# Patient Record
Sex: FEMALE | Race: WHITE | Employment: UNEMPLOYED | URBAN - METROPOLITAN AREA
[De-identification: names, ages, dates, MRNs, and addresses within clinical notes are randomized per-mention and may not be internally consistent; named-entity substitution may affect disease eponyms.]

---

## 2017-04-11 PROBLEM — Z86.32 HISTORY OF GESTATIONAL DIABETES: Status: ACTIVE | Noted: 2017-04-11

## 2017-05-07 PROBLEM — R87.613 PAP SMEAR ABNORMALITY OF CERVIX WITH HGSIL: Status: ACTIVE | Noted: 2017-05-07

## 2017-07-12 PROBLEM — O24.419 WHITE CLASSIFICATION A2 GESTATIONAL DIABETES MELLITUS: Status: ACTIVE | Noted: 2017-04-11

## 2017-07-28 PROBLEM — D64.9 ANEMIA: Status: ACTIVE | Noted: 2017-07-28

## 2017-08-10 PROBLEM — O24.410 WHITE CLASSIFICATION A1 GESTATIONAL DIABETES MELLITUS: Status: ACTIVE | Noted: 2017-04-11

## 2017-09-07 PROBLEM — O40.9XX0 POLYHYDRAMNIOS: Status: ACTIVE | Noted: 2017-09-07

## 2017-10-18 ENCOUNTER — HOSPITAL ENCOUNTER (INPATIENT)
Age: 37
LOS: 3 days | Discharge: HOME | End: 2017-10-21
Attending: OBSTETRICS & GYNECOLOGY | Admitting: OBSTETRICS & GYNECOLOGY
Payer: OTHER GOVERNMENT

## 2017-10-18 ENCOUNTER — ANESTHESIA EVENT (OUTPATIENT)
Dept: LABOR AND DELIVERY | Age: 37
End: 2017-10-18
Payer: OTHER GOVERNMENT

## 2017-10-18 ENCOUNTER — ANESTHESIA (OUTPATIENT)
Dept: LABOR AND DELIVERY | Age: 37
End: 2017-10-18
Payer: OTHER GOVERNMENT

## 2017-10-18 LAB
ERYTHROCYTE [DISTWIDTH] IN BLOOD BY AUTOMATED COUNT: 16.2 % (ref 11.9–14.6)
GLUCOSE BLD STRIP.AUTO-MCNC: 161 MG/DL (ref 65–100)
HCT VFR BLD AUTO: 38.4 % (ref 35.8–46.3)
HGB BLD-MCNC: 13 G/DL (ref 11.7–15.4)
MCH RBC QN AUTO: 30.4 PG (ref 26.1–32.9)
MCHC RBC AUTO-ENTMCNC: 33.9 G/DL (ref 31.4–35)
MCV RBC AUTO: 89.9 FL (ref 79.6–97.8)
PLATELET # BLD AUTO: 245 K/UL (ref 150–450)
PMV BLD AUTO: 9.5 FL (ref 10.8–14.1)
RBC # BLD AUTO: 4.27 M/UL (ref 4.05–5.25)
WBC # BLD AUTO: 11.7 K/UL (ref 4.3–11.1)

## 2017-10-18 PROCEDURE — 74011000250 HC RX REV CODE- 250

## 2017-10-18 PROCEDURE — 86900 BLOOD TYPING SEROLOGIC ABO: CPT | Performed by: OBSTETRICS & GYNECOLOGY

## 2017-10-18 PROCEDURE — 85027 COMPLETE CBC AUTOMATED: CPT | Performed by: OBSTETRICS & GYNECOLOGY

## 2017-10-18 PROCEDURE — A4300 CATH IMPL VASC ACCESS PORTAL: HCPCS | Performed by: ANESTHESIOLOGY

## 2017-10-18 PROCEDURE — 65270000029 HC RM PRIVATE

## 2017-10-18 PROCEDURE — 74011250636 HC RX REV CODE- 250/636

## 2017-10-18 PROCEDURE — 4A1HXCZ MONITORING OF PRODUCTS OF CONCEPTION, CARDIAC RATE, EXTERNAL APPROACH: ICD-10-PCS | Performed by: OBSTETRICS & GYNECOLOGY

## 2017-10-18 PROCEDURE — 82962 GLUCOSE BLOOD TEST: CPT

## 2017-10-18 PROCEDURE — 77030014125 HC TY EPDRL BBMI -B: Performed by: ANESTHESIOLOGY

## 2017-10-18 RX ORDER — DEXTROSE 40 %
15 GEL (GRAM) ORAL AS NEEDED
Status: DISCONTINUED | OUTPATIENT
Start: 2017-10-18 | End: 2017-10-19

## 2017-10-18 RX ORDER — ROPIVACAINE HYDROCHLORIDE 2 MG/ML
INJECTION, SOLUTION EPIDURAL; INFILTRATION; PERINEURAL AS NEEDED
Status: DISCONTINUED | OUTPATIENT
Start: 2017-10-18 | End: 2017-10-19 | Stop reason: HOSPADM

## 2017-10-18 RX ORDER — DEXTROSE 50 % IN WATER (D50W) INTRAVENOUS SYRINGE
25-50 AS NEEDED
Status: DISCONTINUED | OUTPATIENT
Start: 2017-10-18 | End: 2017-10-19

## 2017-10-18 RX ORDER — LIDOCAINE HYDROCHLORIDE 20 MG/ML
JELLY TOPICAL
Status: DISCONTINUED | OUTPATIENT
Start: 2017-10-18 | End: 2017-10-19 | Stop reason: HOSPADM

## 2017-10-18 RX ORDER — OXYTOCIN/RINGER'S LACTATE 30/500 ML
.5-2 PLASTIC BAG, INJECTION (ML) INTRAVENOUS
Status: CANCELLED | OUTPATIENT
Start: 2017-10-18

## 2017-10-18 RX ORDER — LIDOCAINE HYDROCHLORIDE 20 MG/ML
JELLY TOPICAL
Status: CANCELLED | OUTPATIENT
Start: 2017-10-18

## 2017-10-18 RX ORDER — ONDANSETRON 2 MG/ML
4 INJECTION INTRAMUSCULAR; INTRAVENOUS
Status: DISCONTINUED | OUTPATIENT
Start: 2017-10-18 | End: 2017-10-19 | Stop reason: HOSPADM

## 2017-10-18 RX ORDER — LIDOCAINE HYDROCHLORIDE 10 MG/ML
1 INJECTION INFILTRATION; PERINEURAL
Status: CANCELLED | OUTPATIENT
Start: 2017-10-18

## 2017-10-18 RX ORDER — BUTORPHANOL TARTRATE 1 MG/ML
1 INJECTION INTRAMUSCULAR; INTRAVENOUS
Status: DISCONTINUED | OUTPATIENT
Start: 2017-10-18 | End: 2017-10-19 | Stop reason: HOSPADM

## 2017-10-18 RX ORDER — MINERAL OIL
120 OIL (ML) ORAL
Status: COMPLETED | OUTPATIENT
Start: 2017-10-18 | End: 2017-10-19

## 2017-10-18 RX ORDER — BUTORPHANOL TARTRATE 1 MG/ML
1 INJECTION INTRAMUSCULAR; INTRAVENOUS
Status: CANCELLED | OUTPATIENT
Start: 2017-10-18

## 2017-10-18 RX ORDER — ROPIVACAINE HYDROCHLORIDE 2 MG/ML
INJECTION, SOLUTION EPIDURAL; INFILTRATION; PERINEURAL
Status: DISCONTINUED | OUTPATIENT
Start: 2017-10-18 | End: 2017-10-19 | Stop reason: HOSPADM

## 2017-10-18 RX ORDER — LIDOCAINE HYDROCHLORIDE 10 MG/ML
1 INJECTION INFILTRATION; PERINEURAL
Status: DISCONTINUED | OUTPATIENT
Start: 2017-10-18 | End: 2017-10-19 | Stop reason: HOSPADM

## 2017-10-18 RX ORDER — MINERAL OIL
120 OIL (ML) ORAL
Status: CANCELLED | OUTPATIENT
Start: 2017-10-18

## 2017-10-18 RX ORDER — LIDOCAINE HYDROCHLORIDE AND EPINEPHRINE 15; 5 MG/ML; UG/ML
INJECTION, SOLUTION EPIDURAL AS NEEDED
Status: DISCONTINUED | OUTPATIENT
Start: 2017-10-18 | End: 2017-10-19 | Stop reason: HOSPADM

## 2017-10-18 RX ADMIN — ROPIVACAINE HYDROCHLORIDE 10 ML/HR: 2 INJECTION, SOLUTION EPIDURAL; INFILTRATION; PERINEURAL at 23:13

## 2017-10-18 RX ADMIN — LIDOCAINE HYDROCHLORIDE AND EPINEPHRINE 3 ML: 15; 5 INJECTION, SOLUTION EPIDURAL at 23:12

## 2017-10-18 RX ADMIN — ROPIVACAINE HYDROCHLORIDE 8 ML: 2 INJECTION, SOLUTION EPIDURAL; INFILTRATION; PERINEURAL at 23:13

## 2017-10-18 NOTE — IP AVS SNAPSHOT
[address] [facility] [address] 
[phone] Patient: Sandy Bowels MRN: [id number] OVU:[id number] You are allergic to the following Allergen Reactions Pcn (Penicillins) Rash Immunizations Administered for This Admission Name Date MMR  Deferred () Tdap  Deferred () Recent Documentation Weight Breastfeeding? OB Status Smoking Status 85.7 kg Unknown Recent pregnancy Never Smoker Emergency Contacts Name Discharge Info Relation Home Work Mobile Percy Angulo  Spouse [3] [phone] About your hospitalization You were admitted on:  October 18, 2017 You last received care in the:  [address] [facility] You were discharged on:  October 21, 2017 Unit phone number:  [phone] Why you were hospitalized Your primary diagnosis was:  Normal Labor Your diagnoses also included:  40 Weeks Gestation Of Pregnancy, Pap Smear Abnormality Of Cervix With Hgsil, Polyhydramnios, White Classification A2 Gestational Diabetes Mellitus Providers Seen During Your Hospitalizations Provider Role Specialty Primary office phone Rodolfo Hidalgo MD Attending Provider Obstetrics & Gynecology [phone] Your Primary Care Physician (PCP) Primary Care Physician Office Phone Office Fax OTHER, PHYS ** None ** ** None ** Follow-up Information Follow up With Details Comments Contact Info Ector Miller MD   Patient can only remember the practice name and not the physician Karthikeyan Garcia MD In 6 weeks As needed and/or, If symptoms worsen [address] [facility] [address] [phone] Your Appointments Thursday November 30, 2017  4:30 PM EST POSTPARTUM VISIT with Jean Lynne MD  
[facility] [address] [address] [phone] Current Discharge Medication List  
  
START taking these medications Dose & Instructions Dispensing Information Comments Morning Noon Evening Bedtime HYDROmorphone 2 mg tablet Commonly known as:  DILAUDID Your last dose was: Your next dose is:    
   
   
 Dose:  2 mg Take 1 Tab by mouth every four (4) hours as needed for Pain. Max Daily Amount: 12 mg. Quantity:  5 Tab Refills:  0  
     
   
   
   
  
 ibuprofen 800 mg tablet Commonly known as:  MOTRIN Your last dose was: Your next dose is:    
   
   
 Dose:  800 mg Take 1 Tab by mouth every eight (8) hours as needed for Pain. Quantity:  90 Tab Refills:  1 CONTINUE these medications which have NOT CHANGED Dose & Instructions Dispensing Information Comments Morning Noon Evening Bedtime PRENATAL DHA+COMPLETE PRENATAL -300 mg-mcg-mg Cmpk Generic drug:  [id number]-VDEW deann-folic-dha Your last dose was: Your next dose is: Take  by mouth. Refills:  0 STOP taking these medications IRON PO  
   
  
 magnesium oxide 400 mg tablet Commonly known as:  MAG-OX  
   
  
 metFORMIN  mg tablet Commonly known as:  GLUCOPHAGE XR Where to Get Your Medications These medications were sent to [facility]/pharmacy #[id number]- [address] [facility], [address] [facility] [address] [facility]  [address] [place] [address], [address] [place] [address] Phone:  [phone]  
  ibuprofen 800 mg tablet Information on where to get these meds will be given to you by the nurse or doctor. ! Ask your nurse or doctor about these medications HYDROmorphone 2 mg tablet Discharge Instructions Discharge instruction to follow: Activity: Pelvis rest for 6 weeks No heavy lifting over 15 lbs for 2 weeks No push/pull motion such as sweeping or vacuuming for 2 weeks No tub baths for 6 weeks If using sitz bath continue until comfortable stopping. If using gala-bottle continue to use until comfortable stopping. Change sanitary pad after each urination or bowel movement. Call MD for the following: 
    Fever over 101 F; pain not relieved by medication; foul smelling vaginal discharge or an increase in vaginal bleeding. Take medication as prescribed. Follow up with MD as order. After Your Delivery (the Postpartum Period): Care Instructions Your Care Instructions Congratulations on the birth of your baby. Like pregnancy, the  period can be a time of excitement, ted, and exhaustion. You may look at your wondrous little baby and feel happy. You may also be overwhelmed by your new sleep hours and new responsibilities. At first, babies often sleep during the days and are awake at night. They do not have a pattern or routine. They may make sudden gasps, jerk themselves awake, or look like they have crossed eyes. These are all normal, and they may even make you smile. In these first weeks after delivery, try to take good care of yourself. It may take 4 to 6 weeks to feel like yourself again, and possibly longer if you had a  birth. You will likely feel very tired for several weeks. Your days will be full of ups and downs, but lots of ted as well. Follow-up care is a key part of your treatment and safety. Be sure to make and go to all appointments, and call your doctor if you are having problems. It's also a good idea to know your test results and keep a list of the medicines you take. How can you care for yourself at home? Take care of your body after delivery · Use pads instead of tampons for the bloody flow that may last as long as 2 weeks. · Ease cramps with ibuprofen (Advil, Motrin). · Ease soreness of hemorrhoids and the area between your vagina and rectum with ice compresses or witch hazel pads. · Ease constipation by drinking lots of fluid and eating high-fiber foods. Ask your doctor about over-the-counter stool softeners. · Cleanse yourself with a gentle squeeze of warm water from a bottle instead of wiping with toilet paper. · Take a sitz bath in warm water several times a day. · Wear a good nursing bra. Ease sore and swollen breasts with warm, wet washcloths. · If you are not breastfeeding, use ice rather than heat for breast soreness. · Your period may not start for several months if you are breastfeeding. You may bleed more, and longer at first, than you did before you got pregnant. · Wait until you are healed (about 4 to 6 weeks) before you have sexual intercourse. Your doctor will tell you when it is okay to have sex. · Try not to travel with your baby for 5 or 6 weeks. If you take a long car trip, make frequent stops to walk around and stretch. Avoid exhaustion · Rest every day. Try to nap when your baby naps. · Ask another adult to be with you for a few days after delivery. · Plan for  if you have other children. · Stay flexible so you can eat at odd hours and sleep when you need to. Both you and your baby are making new schedules. · Plan small trips to get out of the house. Change can make you feel less tired. · Ask for help with housework, cooking, and shopping. Remind yourself that your job is to care for your baby. Know about help for postpartum depression · \"Baby blues\" are common for the first 1 to 2 weeks after birth. You may cry or feel sad or irritable for no reason. · Rest whenever you can. Being tired makes it harder to handle your emotions. · Go for walks with your baby. · Talk to your partner, friends, and family about your feelings. · If your symptoms last for more than a few weeks, or if you feel very depressed, ask your doctor for help. · Postpartum depression can be treated. Support groups and counseling can help. Sometimes medicine can also help. Stay healthy · Eat healthy foods so you have more energy, make good breast milk, and lose extra baby pounds. · If you breastfeed, avoid alcohol and drugs. Stay smoke-free. If you quit during pregnancy, congratulations. · Start daily exercise after 4 to 6 weeks, but rest when you feel tired. · Learn exercises to tone your belly. Do Kegel exercises to regain strength in your pelvic muscles. You can do these exercises while you stand or sit. ¨ Squeeze the same muscles you would use to stop your urine. Your belly and thighs should not move. ¨ Hold the squeeze for 3 seconds, and then relax for 3 seconds. ¨ Start with 3 seconds. Then add 1 second each week until you are able to squeeze for 10 seconds. ¨ Repeat the exercise 10 to 15 times for each session. Do three or more sessions each day. · Find a class for new mothers and new babies that has an exercise time. · If you had a  birth, give yourself a bit more time before you exercise, and be careful. When should you call for help? Call 911 anytime you think you may need emergency care. For example, call if: 
· You passed out (lost consciousness). Call your doctor now or seek immediate medical care if: 
· You have severe vaginal bleeding. This means you are passing blood clots and soaking through a pad each hour for 2 or more hours. · You are dizzy or lightheaded, or you feel like you may faint. · You have a fever. · You have new belly pain, or your pain gets worse. Watch closely for changes in your health, and be sure to contact your doctor if: 
· Your vaginal bleeding seems to be getting heavier. · You have new or worse vaginal discharge. · You feel sad, anxious, or hopeless for more than a few days. · You do not get better as expected. Where can you learn more? Go to http://marcella-joey.info/. Enter A461 in the search box to learn more about \"After Your Delivery (the Postpartum Period): Care Instructions. \" 
 Current as of: March 16, 2017 Content Version: 11.3 © 9108-9220 [employer]. Care instructions adapted under license by [employer] (which disclaims liability or warranty for this information). If you have questions about a medical condition or this instruction, always ask your healthcare professional. [address] any warranty or liability for your use of this information. Vaginal Childbirth: Care Instructions Your Care Instructions Your body will slowly heal in the next few weeks. It is easy to get too tired and overwhelmed during the first weeks after your baby is born. Changes in your hormones can shift your mood without warning. You may find it hard to meet the extra demands on your energy and time. Take it easy on yourself. Follow-up care is a key part of your treatment and safety. Be sure to make and go to all appointments, and call your doctor if you are having problems. It's also a good idea to know your test results and keep a list of the medicines you take. How can you care for yourself at home? · Vaginal bleeding and cramps ¨ After delivery, you will have a bloody discharge from the vagina. This will turn pink within a week and then white or yellow after about 10 days. It may last for 2 to 4 weeks or longer, until the uterus has healed. Use pads instead of tampons until you stop bleeding. ¨ Do not worry if you pass some blood clots, as long as they are smaller than a golf ball. If you have a tear or stitches in your vaginal area, change the pad at least every 4 hours to prevent soreness and infection. ¨ You may have cramps for the first few days after childbirth. These are normal and occur as the uterus shrinks to normal size. Take an over-the-counter pain medicine, such as acetaminophen (Tylenol), ibuprofen (Advil, Motrin), or naproxen (Aleve), for cramps.  Read and follow all instructions on the label. Do not take aspirin, because it can cause more bleeding. ¨ Do not take two or more pain medicines at the same time unless the doctor told you to. Many pain medicines have acetaminophen, which is Tylenol. Too much acetaminophen (Tylenol) can be harmful. · Stitches ¨ If you have stitches, they will dissolve on their own and do not need to be removed. Follow your doctor's instructions for cleaning the stitched area. ¨ Put ice or a cold pack on your painful area for 10 to 20 minutes at a time, several times a day, for the first few days. Put a thin cloth between the ice and your skin. ¨ Sit in a few inches of warm water (sitz bath) 3 times a day and after bowel movements. The warm water helps with pain and itching. If you do not have a tub, a warm shower might help. · Breast fullness ¨ Your breasts may overfill (engorge) in the first few days after delivery. To help milk flow and to relieve pain, warm your breasts in the shower or by using warm, moist towels before nursing. ¨ If you are not nursing, do not put warmth on your breasts or touch your breasts. Wear a tight bra or sports bra and use ice until the fullness goes away. This usually takes 2 to 3 days. ¨ Put ice or a cold pack on your breast after nursing to reduce swelling and pain. Put a thin cloth between the ice and your skin. · Activity ¨ Eat a balanced diet. Do not try to lose weight by cutting calories. Keep taking your prenatal vitamins, or take a multivitamin. ¨ Get as much rest as you can. Try to take naps when your baby sleeps during the day. ¨ Get some exercise every day. But do not do any heavy exercise until your doctor says it is okay. ¨ Wait until you are healed (about 4 to 6 weeks) before you have sexual intercourse. Your doctor will tell you when it is okay to have sex. ¨ Talk to your doctor about birth control. You can get pregnant even before your period returns.  Also, you can get pregnant while you are breastfeeding. · Mental health ¨ It is normal to have some sadness, anxiety, sleeplessness, and mood swings after you go home. If you feel upset or hopeless for more than a few days or are having trouble doing the things you need to do, talk to your doctor. · Constipation and hemorrhoids ¨ Drink plenty of fluids, enough so that your urine is light yellow or clear like water. If you have kidney, heart, or liver disease and have to limit fluids, talk with your doctor before you increase the amount of fluids you drink. ¨ Eat plenty of fiber each day. Have a bran muffin or bran cereal for breakfast, and try eating a piece of fruit for a mid-afternoon snack. ¨ For painful, itchy hemorrhoids, put ice or a cold pack on the area several times a day for 10 minutes at a time. Follow this by putting a warm compress on the area for another 10 to 20 minutes or by sitting in a shallow, warm bath. When should you call for help? Call 911 anytime you think you may need emergency care. For example, call if: 
· You are thinking of hurting yourself, your baby, or anyone else. · You have sudden, severe pain in your belly. · You passed out (lost consciousness). Call your doctor now or seek immediate medical care if: 
· You have severe vaginal bleeding. · You are soaking through a pad each hour for 2 or more hours. · Your vaginal bleeding seems to be getting heavier or is still bright red 4 days after delivery. · You are dizzy or lightheaded, or you feel like you may faint. · You are vomiting or cannot keep fluids down. · You have a fever. · You have new or more belly pain. · You pass tissue (not just blood). · Your vaginal discharge smells bad. · Your belly feels tender or full and hard. · Your breasts are continuously painful or red. · You feel sad, anxious, or hopeless for more than a few days. Watch closely for changes in your health, and be sure to contact your doctor if you have any problems. Where can you learn more? Go to http://marcella-joey.info/. Enter X946 in the search box to learn more about \"Vaginal Childbirth: Care Instructions. \" Current as of: March 16, 2017 Content Version: 11.3 © 3537-5899 Gimahhot. Care instructions adapted under license by [employer]athered[employer] (which disclaims liability or warranty for this information). If you have questions about a medical condition or this instruction, always ask your healthcare professional. [place] [address] any warranty or liability for your use of this information. Discharge Orders None Marketshot Announcement We are excited to announce that we are making your provider's discharge notes available to you in Marketshot. You will see these notes when they are completed and signed by the physician that discharged you from your recent hospital stay. If you have any questions or concerns about any information you see in Marketshot, please call the Health Information Department where you were seen or reach out to your Primary Care Provider for more information about your plan of care. Introducing hospitals & HEALTH SERVICES! Dear Akiko Leach: 
Thank you for requesting a Marketshot account. Our records indicate that you already have an active Marketshot account. You can access your account anytime at [email or web address] "[employer]/PacerPro Did you know that you can access your hospital and ER discharge instructions at any time in Marketshot? You can also review all of your test results from your hospital stay or ER visit. Additional Information If you have questions, please visit the Frequently Asked Questions section of the Marketshot website at [email or web address] "[employer]/. Remember, Marketshot is NOT to be used for urgent needs. For medical emergencies, dial 911. Now available from your iPhone and Android! General Information Please provide this summary of care documentation to your next provider. Patient Signature:  ____________________________________________________________ Date:  ____________________________________________________________  
  
Suzon Mems Provider Signature:  ____________________________________________________________ Date:  ____________________________________________________________

## 2017-10-19 PROBLEM — Z3A.40 40 WEEKS GESTATION OF PREGNANCY: Status: ACTIVE | Noted: 2017-10-19

## 2017-10-19 PROBLEM — Z37.9 NORMAL LABOR: Status: ACTIVE | Noted: 2017-10-19

## 2017-10-19 LAB
ABO + RH BLD: NORMAL
BASE DEFICIT BLDCOA-SCNC: 3.3 MMOL/L (ref 0–2)
BASE DEFICIT BLDCOV-SCNC: 1.9 MMOL/L (ref 1.9–7.7)
BDY SITE: ABNORMAL
BDY SITE: NORMAL
BLOOD GROUP ANTIBODIES SERPL: NORMAL
GLUCOSE BLD STRIP.AUTO-MCNC: 114 MG/DL (ref 65–100)
GLUCOSE BLD STRIP.AUTO-MCNC: 155 MG/DL (ref 65–100)
GLUCOSE BLD STRIP.AUTO-MCNC: 84 MG/DL (ref 65–100)
GLUCOSE BLD STRIP.AUTO-MCNC: 91 MG/DL (ref 65–100)
GLUCOSE BLD STRIP.AUTO-MCNC: 91 MG/DL (ref 65–100)
GLUCOSE BLD STRIP.AUTO-MCNC: 95 MG/DL (ref 65–100)
HCO3 BLDCOA-SCNC: 26 MMOL/L (ref 22–26)
HCO3 BLDV-SCNC: 23 MMOL/L
PCO2 BLDCOA: 68 MMHG (ref 33–49)
PCO2 BLDCOV: 39 MMHG (ref 14.1–43.3)
PH BLDCOA: 7.21 [PH] (ref 7.21–7.31)
PH BLDCOV: 7.38 [PH] (ref 7.2–7.44)
PO2 BLDCOA: 20 MMHG (ref 9–19)
PO2 BLDV: 36 MMHG (ref 30.4–57.2)
SERVICE CMNT-IMP: ABNORMAL
SERVICE CMNT-IMP: NORMAL
SPECIMEN EXP DATE BLD: NORMAL

## 2017-10-19 PROCEDURE — 10907ZC DRAINAGE OF AMNIOTIC FLUID, THERAPEUTIC FROM PRODUCTS OF CONCEPTION, VIA NATURAL OR ARTIFICIAL OPENING: ICD-10-PCS | Performed by: OBSTETRICS & GYNECOLOGY

## 2017-10-19 PROCEDURE — 75410000002 HC LABOR FEE PER 1 HR

## 2017-10-19 PROCEDURE — 82803 BLOOD GASES ANY COMBINATION: CPT

## 2017-10-19 PROCEDURE — 99282 EMERGENCY DEPT VISIT SF MDM: CPT

## 2017-10-19 PROCEDURE — 59025 FETAL NON-STRESS TEST: CPT

## 2017-10-19 PROCEDURE — 74011250636 HC RX REV CODE- 250/636: Performed by: OBSTETRICS & GYNECOLOGY

## 2017-10-19 PROCEDURE — 36415 COLL VENOUS BLD VENIPUNCTURE: CPT | Performed by: OBSTETRICS & GYNECOLOGY

## 2017-10-19 PROCEDURE — 75410000003 HC RECOV DEL/VAG/CSECN EA 0.5 HR

## 2017-10-19 PROCEDURE — 75410000000 HC DELIVERY VAGINAL/SINGLE

## 2017-10-19 PROCEDURE — 76060000078 HC EPIDURAL ANESTHESIA

## 2017-10-19 PROCEDURE — 82962 GLUCOSE BLOOD TEST: CPT

## 2017-10-19 PROCEDURE — 77030003028 HC SUT VCRL J&J -A

## 2017-10-19 PROCEDURE — 74011250637 HC RX REV CODE- 250/637: Performed by: OBSTETRICS & GYNECOLOGY

## 2017-10-19 PROCEDURE — 77030018846 HC SOL IRR STRL H20 ICUM -A

## 2017-10-19 PROCEDURE — 0KQM0ZZ REPAIR PERINEUM MUSCLE, OPEN APPROACH: ICD-10-PCS | Performed by: OBSTETRICS & GYNECOLOGY

## 2017-10-19 PROCEDURE — 74011258636 HC RX REV CODE- 258/636: Performed by: OBSTETRICS & GYNECOLOGY

## 2017-10-19 PROCEDURE — 77030011945 HC CATH URIN INT ST MENT -A

## 2017-10-19 PROCEDURE — 77030011943

## 2017-10-19 PROCEDURE — 65270000029 HC RM PRIVATE

## 2017-10-19 RX ORDER — OXYCODONE AND ACETAMINOPHEN 5; 325 MG/1; MG/1
1-2 TABLET ORAL
Status: DISCONTINUED | OUTPATIENT
Start: 2017-10-19 | End: 2017-10-21 | Stop reason: HOSPADM

## 2017-10-19 RX ORDER — SODIUM CHLORIDE 0.9 % (FLUSH) 0.9 %
5-10 SYRINGE (ML) INJECTION AS NEEDED
Status: DISCONTINUED | OUTPATIENT
Start: 2017-10-19 | End: 2017-10-19

## 2017-10-19 RX ORDER — DIPHENHYDRAMINE HCL 25 MG
25 CAPSULE ORAL
Status: DISCONTINUED | OUTPATIENT
Start: 2017-10-19 | End: 2017-10-21 | Stop reason: HOSPADM

## 2017-10-19 RX ORDER — SODIUM CHLORIDE 0.9 % (FLUSH) 0.9 %
5-10 SYRINGE (ML) INJECTION EVERY 8 HOURS
Status: DISCONTINUED | OUTPATIENT
Start: 2017-10-19 | End: 2017-10-19

## 2017-10-19 RX ORDER — METHYLERGONOVINE MALEATE 0.2 MG/ML
0.2 INJECTION INTRAVENOUS
Status: DISCONTINUED | OUTPATIENT
Start: 2017-10-19 | End: 2017-10-21 | Stop reason: HOSPADM

## 2017-10-19 RX ORDER — NALOXONE HYDROCHLORIDE 0.4 MG/ML
0.4 INJECTION, SOLUTION INTRAMUSCULAR; INTRAVENOUS; SUBCUTANEOUS
Status: DISCONTINUED | OUTPATIENT
Start: 2017-10-19 | End: 2017-10-21 | Stop reason: HOSPADM

## 2017-10-19 RX ORDER — DEXTROSE, SODIUM CHLORIDE, SODIUM LACTATE, POTASSIUM CHLORIDE, AND CALCIUM CHLORIDE 5; .6; .31; .03; .02 G/100ML; G/100ML; G/100ML; G/100ML; G/100ML
125 INJECTION, SOLUTION INTRAVENOUS CONTINUOUS
Status: DISCONTINUED | OUTPATIENT
Start: 2017-10-19 | End: 2017-10-19

## 2017-10-19 RX ORDER — OXYTOCIN/0.9 % SODIUM CHLORIDE 15/250 ML
250 PLASTIC BAG, INJECTION (ML) INTRAVENOUS ONCE
Status: COMPLETED | OUTPATIENT
Start: 2017-10-19 | End: 2017-10-19

## 2017-10-19 RX ORDER — OXYTOCIN/RINGER'S LACTATE 15/250 ML
250 PLASTIC BAG, INJECTION (ML) INTRAVENOUS ONCE
Status: DISCONTINUED | OUTPATIENT
Start: 2017-10-19 | End: 2017-10-19 | Stop reason: SDUPTHER

## 2017-10-19 RX ORDER — IBUPROFEN 800 MG/1
800 TABLET ORAL EVERY 8 HOURS
Status: DISCONTINUED | OUTPATIENT
Start: 2017-10-19 | End: 2017-10-20

## 2017-10-19 RX ORDER — ONDANSETRON 8 MG/1
8 TABLET, ORALLY DISINTEGRATING ORAL
Status: DISCONTINUED | OUTPATIENT
Start: 2017-10-19 | End: 2017-10-21 | Stop reason: HOSPADM

## 2017-10-19 RX ORDER — DOCUSATE SODIUM 100 MG/1
100 CAPSULE, LIQUID FILLED ORAL 2 TIMES DAILY
Status: DISCONTINUED | OUTPATIENT
Start: 2017-10-19 | End: 2017-10-21 | Stop reason: HOSPADM

## 2017-10-19 RX ORDER — SIMETHICONE 80 MG
80 TABLET,CHEWABLE ORAL
Status: DISCONTINUED | OUTPATIENT
Start: 2017-10-19 | End: 2017-10-21 | Stop reason: HOSPADM

## 2017-10-19 RX ORDER — OXYTOCIN/0.9 % SODIUM CHLORIDE 15/250 ML
250 PLASTIC BAG, INJECTION (ML) INTRAVENOUS ONCE
Status: ACTIVE | OUTPATIENT
Start: 2017-10-19 | End: 2017-10-20

## 2017-10-19 RX ORDER — HYDROMORPHONE HYDROCHLORIDE 1 MG/ML
1 INJECTION, SOLUTION INTRAMUSCULAR; INTRAVENOUS; SUBCUTANEOUS
Status: DISCONTINUED | OUTPATIENT
Start: 2017-10-19 | End: 2017-10-21 | Stop reason: HOSPADM

## 2017-10-19 RX ADMIN — SODIUM CHLORIDE, SODIUM LACTATE, POTASSIUM CHLORIDE, CALCIUM CHLORIDE, AND DEXTROSE MONOHYDRATE 125 ML/HR: 600; 310; 30; 20; 5 INJECTION, SOLUTION INTRAVENOUS at 03:02

## 2017-10-19 RX ADMIN — DOCUSATE SODIUM 100 MG: 100 CAPSULE, LIQUID FILLED ORAL at 17:31

## 2017-10-19 RX ADMIN — MINERAL OIL 120 ML: 471.95 OIL ORAL at 09:37

## 2017-10-19 RX ADMIN — Medication 15000 MILLI-UNITS/HR: at 09:38

## 2017-10-19 RX ADMIN — IBUPROFEN 800 MG: 800 TABLET, FILM COATED ORAL at 20:04

## 2017-10-19 NOTE — PROGRESS NOTES
Dr Alma Bee notified of pt status. MD is on her way to [facility] do not push until she gets here.

## 2017-10-19 NOTE — PROGRESS NOTES
Chart reviewed.    Patient Vitals for the past 12 hrs:   Temp Pulse Resp BP   10/19/17 0840 - 93 - 100/61   10/19/17 0825 - 100 - 109/59   10/19/17 0810 - 100 - 109/56   10/19/17 0757 - 97 - 104/62   10/19/17 0742 98 °F (36.7 °C) 92 - 97/58   10/19/17 0725 - 90 - 110/68   10/19/17 0712 - 87 - 104/66   10/19/17 0655 - 90 - 103/66   10/19/17 0652 - 91 - 108/68   10/19/17 0647 - 97 16 (!) 88/58   10/19/17 0640 - 86 - (!) 89/53   10/19/17 0638 - 93 - 90/54   10/19/17 0629 - 89 - 91/56   10/19/17 0624 - 93 - 90/57   10/19/17 0622 - 98 - 91/56   10/19/17 0618 - 88 - (!) 88/52   10/19/17 0615 - 92 - (!) 87/52   10/19/17 0614 - 92 - (!) 83/51   10/19/17 0612 - 93 - (!) 87/52   10/19/17 0611 - 90 - (!) 84/53   10/19/17 0556 - 96 16 98/54   10/19/17 0540 - 100 14 95/52   10/19/17 0526 - 94 - 105/59   10/19/17 0512 - 93 14 107/55   10/19/17 0455 - 89 16 100/57   10/19/17 0441 - 92 14 104/56   10/19/17 0425 98.1 °F (36.7 °C) (!) 104 16 106/58   10/19/17 0410 - 99 16 102/59   10/19/17 0356 - 94 16 105/59   10/19/17 0341 - 96 - 103/57   10/19/17 0325 - (!) 108 - 95/50   10/19/17 0311 - 89 - 101/56   10/19/17 0255 - 90 - 102/57   10/19/17 0253 - (!) 108 - 102/58   10/19/17 0240 97.9 °F (36.6 °C) (!) 108 16 94/54   10/19/17 0227 - 90 - 96/55   10/19/17 0211 - (!) 103 - 95/51   10/19/17 0156 - (!) 116 - 100/55   10/19/17 0141 - 93 - 109/58   10/19/17 0126 - 95 - 107/56   10/19/17 0111 - (!) 101 - 110/60   10/19/17 0056 - (!) 105 - 101/59   10/19/17 0041 - (!) 101 - 104/55   10/19/17 0026 - (!) 106 - 106/55   10/19/17 0006 - (!) 108 16 103/56   10/19/17 0002 - (!) 111 16 102/59   10/18/17 2356 - (!) 110 - 105/56   10/18/17 2352 - (!) 110 - 107/59   10/18/17 2347 - (!) 120 - 105/56   10/18/17 2342 - (!) 110 - 109/57   10/18/17 2336 97.9 °F (36.6 °C) (!) 123 - 102/56   10/18/17 2332 - (!) 106 - 109/61   10/18/17 2330 - (!) 116 - 100/57   10/18/17 2325 - (!) 116 - 100/55   10/18/17 2321 - (!) 121 - 102/54   10/18/17 2320 - (!) 117 16 108/54   10/18/17 2319 - (!) 112 16 108/55   10/18/17 2317 - (!) 114 18 99/58   10/18/17 2316 - (!) 111 16 102/55   10/18/17 2315 - (!) 106 16 107/57   10/18/17 2314 - (!) 108 18 104/59   10/18/17 2313 - 100 18 96/61   10/18/17 2312 - 100 - 109/63   10/18/17 2245 98.3 °F (36.8 °C) 96 18 119/73   10/18/17 2202 - 99 16 106/74   10/18/17 2157 98 °F (36.7 °C) - - -       Cervix:  C/C/+2  Membranes:  AROM earlier  FHTs:   Baseline 140s w/ accels. Comfortable w/ LUCINDA. EFW 8 lb 1 oz 10/4/17. Proven to 9 lb 11 oz. A2DM. Anticipates a boy.    Push

## 2017-10-19 NOTE — PROGRESS NOTES
Dr. Cheikh Martinez called to desk for update on pt status. MD informed of pt SVE of 6 and stabilized blood sugars. No new orders at this time.

## 2017-10-19 NOTE — LACTATION NOTE
This note was copied from a baby's chart. In to see mom and infant for first time. Mom states infant is latching and feeding well, no issues. Infant asleep at this time in bassinet. Mom experienced, breast fed other 2 children for 15 months each. Reviewed 1st 24 hr feeding/output expectations. No further needs at this time.  Lactation to follow up in am.

## 2017-10-19 NOTE — ROUTINE PROCESS
SBAR IN Report: Mother    Verbal report received from Nehal Katz RN (full name & credentials) on this patient, who is now being transferred from [facility] (unit) for routine progression of care. The patient is not wearing a green \"Anesthesia-Duramorph\" band. Report consisted of patient's Situation, Background, Assessment and Recommendations (SBAR). [place] ID bands were compared with the identification form, and verified with the patient and transferring nurse. Information from the SBAR and the [place] Report was reviewed with the transferring nurse; opportunity for questions and clarification provided.

## 2017-10-19 NOTE — ROUTINE PROCESS
SBAR OUT Report: Mother    Verbal report given to UMU Pina RN (full name & credentials) on this patient, who is now being transferred to [facility] (unit) for routine progression of care. The patient is not wearing a green \"Anesthesia-Duramorph\" band. Report consisted of patient's Situation, Background, Assessment and Recommendations (SBAR). [place] ID bands were compared with the identification form, and verified with the patient and receiving nurse. Information from the SBAR and the [address] [facility] Report was reviewed with the receiving nurse; opportunity for questions and clarification provided.

## 2017-10-19 NOTE — ANESTHESIA PROCEDURE NOTES
Epidural Block    Start time: 10/18/2017 11:06 PM  End time: 10/18/2017 11:12 PM  Performed by: Lane Hawk  Authorized by: Lane Hawk     Pre-Procedure  Indication: labor epidural    Preanesthetic Checklist: patient identified, risks and benefits discussed, anesthesia consent, site marked, patient being monitored, timeout performed and anesthesia consent    Timeout Time: 23:02        Epidural:   Patient position:  Seated  Prep region:  Lumbar  Location:  L2-3    Needle and Epidural Catheter:   Needle Type:  Tuohy  Needle Gauge:  17 G  Injection Technique:  Loss of resistance using air  Attempts:  1  Catheter Size:  19 G  Catheter at Skin Depth (cm):  10  Depth in Epidural Space (cm):  6  Events: no cerebrospinal fluid with aspiration and negative aspiration test    Test Dose:  Lidocaine 1.5% w/ epi and negative (3ml)    Assessment:   Catheter Secured:  Tape and tegaderm  Insertion:  Uncomplicated  Patient tolerance:  Patient tolerated the procedure well with no immediate complications

## 2017-10-19 NOTE — PROGRESS NOTES
Dary Jain at bedside at 2300  . JÚNIOR Mao at bedside at 2300    Assisted pt to sitting up on bedside at 2258. Timeout completed at [id number] with MD, JÚNIOR and myself at bedside. Test dose given at 2311. Negative reaction. Dose given at 2315. Pt assisted to lying back in left tilt position. See anesthesia record for details. See vital sign flow sheet for BP. Tolerated procedure well.

## 2017-10-19 NOTE — PROGRESS NOTES
Shift assessment complete. See flowsheet for details. POC reviewed with pt and pt verbalized understanding. Pt has no questions or concerns at this time.

## 2017-10-19 NOTE — LACTATION NOTE

## 2017-10-19 NOTE — PROGRESS NOTES
Pt resting in bed. No needs at this time. Pt encouraged to call out PRN. Pt verbalized understanding.

## 2017-10-19 NOTE — ANESTHESIA PREPROCEDURE EVALUATION
Anesthetic History   No history of anesthetic complications            Review of Systems / Medical History  Patient summary reviewed and pertinent labs reviewed    Pulmonary  Within defined limits                 Neuro/Psych   Within defined limits           Cardiovascular                  Exercise tolerance: >4 METS     GI/Hepatic/Renal     GERD           Endo/Other    Diabetes: well controlled         Other Findings   Comments: Intrauterine pregnancy           Physical Exam    Airway  Mallampati: II  TM Distance: 4 - 6 cm  Neck ROM: normal range of motion   Mouth opening: Normal     Cardiovascular    Rhythm: regular  Rate: normal         Dental  No notable dental hx       Pulmonary  Breath sounds clear to auscultation               Abdominal         Other Findings            Anesthetic Plan    ASA: 2  Anesthesia type: epidural            Anesthetic plan and risks discussed with: Patient

## 2017-10-19 NOTE — PROGRESS NOTES
Pt placed on peanut in left lateral position. Pt encouraged to call out PRN or if she feels an urge to push. Pt  at bedside.

## 2017-10-19 NOTE — PROGRESS NOTES
Pt blood sugar 155. Dr. Porsche Kim notified. Orders from MD to start insulin drip at 2 units, Blood sugars hourly, and to turn off insulin drip if blood sugar below 120. Orders repeated to MD and MD confirmed orders read back were correct.

## 2017-10-19 NOTE — PROGRESS NOTES
Problem: Nutrition Deficit  Goal: *Optimize nutritional status  Nutrition  Received referral from Malnutrition Screening [facility] r/t patient has Gestational Diabetes. Pt NPO; active labor. Nutrition screen deferred at this time. F/U per nutrition standard of care.   Hafsa Daniels, [address] [place] [address], [facility]  [phone]

## 2017-10-19 NOTE — L&D DELIVERY NOTE
Delivery Summary    Patient: Steffi Mandel MRN: [id number]  SSN: [id number]    YOB: 1980  Age: 31 y.o. Sex: female       Information for the patient's :  Kelly Georges [[id number]]       Labor Events:    Labor: No   Rupture Date:     Rupture Time:     Rupture Type AROM   Amniotic Fluid Volume: Moderate    Amniotic Fluid Description: Unable to determine     Induction: None       Augmentation: None   Labor Events: None     Cervical Ripening:     None     Delivery Events:  Episiotomy: None   Laceration(s): Second degree perineal     Repaired: Yes    Number of Repair Packets: 2   Suture Type and Size: Other 2-0 & 3-0 monocryl   Estimated Blood Loss (ml): 100ml       Delivery Date: 10/19/2017    Delivery Time: 9:33 AM  Delivery Type: Vaginal, Spontaneous Delivery  Sex:  Male     Gestational Age: 37w11d   Delivery Clinician:  Dayana Mcgee  Living Status: Living   Delivery Location: [place] [address]          APGARS  One minute Five minutes Ten minutes   Skin color: 0   1        Heart rate: 2   2        Grimace: 2   2        Muscle tone: 2   2        Breathin   2        Totals: 8   9            Presentation: Vertex    Position: Middle Occiput Anterior  Resuscitation Method:  Suctioning-bulb; Tactile Stimulation     Meconium Stained: None      Cord Vessels: 3 Vessels      Cord Events:    Cord Blood Sent?:  Yes    Blood Gases Sent?:  Yes    Placenta:  Date/Time: 10/19 10:00 AM  Removal: Manual Removal      Appearance: Adherent     Holderness Measurements:  Birth Weight: 9 lb 7 oz (4.28 kg)      Birth Length: 53 cm      Head Circumference: 34.5 cm      Chest Circumference: 35 cm     Abdominal Girth:       Other Providers:   Maximo Cano L;MARII POSEY;;;;;;;;CORY BERNABE;YUNG MORSE, Obstetrician;Primary Nurse;Primary Holderness Nurse;Nicu Nurse;Neonatologist;Anesthesiologist;Crna;Nurse Practitioner;Midwife;Nursery Nurse;Scrub Tech;Charge Nurse           Group B Strep: Lab Results   Component Value Date/Time    GrBStrep, External negative  2017     Information for the patient's :  Chrystie Seats [[id number]]   No results found for: ABORH, PCTABR, PCTDIG, BILI, ABORHEXT, ABORH    Lab Results   Component Value Date/Time    APH 7.205 (L) 10/19/2017 09:33 AM    APCO2 68 (H) 10/19/2017 09:33 AM    APO2 20 (H) 10/19/2017 09:33 AM    AHCO3 26 10/19/2017 09:33 AM    ABDC 3.3 (H) 10/19/2017 09:33 AM    EPHV 7.383 10/19/2017 09:33 AM    PCO2V 39 10/19/2017 09:33 AM    PO2V 36 10/19/2017 09:33 AM    HCO3V 23 10/19/2017 09:33 AM    EBDV 1.9 10/19/2017 09:33 AM    SITE CORD 10/19/2017 09:33 AM    SITE CORD 10/19/2017 09:33 AM    RSCOM NA at 10 19 2017 9 55 22 AM. Not read back. 10/19/2017 09:33 AM    RSCOM NA at 10 19 2017 9 56 19 AM. Not read back. 10/19/2017 09:33 AM          C/C/+2----> over a small 2nd degree perineal lac, nares/mouth bulb suctioned on the perineum. After delayed cord clamping the cord was doubly clamped and cut. Baby laid on  mother's chest, nursing assisted Orene Chaves with getting the baby skin to skin. 3V cord. Cord gas & cord blood obtained. Placenta adherent, manually removed. Intrauterine manual exploration:  no placental remnants were palpated/obtained, clots removed, fundus firm. Recto-vaginal exam:  intact along full extent, no buttonhole. No cervical/vaginal or periurethral lacs. Partial 2nd degree lac repaired w/ 2-0  & 3-0 monocryl in the usual fashion. Bladder cathed ~ 200 cc clear urine obtained. Mom/baby stable immediately post delivery. Baby boy, name undecided.

## 2017-10-19 NOTE — PROGRESS NOTES
Pt to triage with complaints of contractions every 2-5 minutes. Was seen at her OB office tues. was 3-4 cm per pt.

## 2017-10-19 NOTE — H&P
History & Physical    Name: Juan Carlos Christianson MRN: [id number]  SSN: [id number]    YOB: 1980  Age: 31 y.o. Sex: female        Subjective: Pt presents in labor. Estimated Date of Delivery: 10/20/17  OB History    Para Term  AB Living   3 2 2   2   SAB TAB Ectopic Molar Multiple Live Births        2      # Outcome Date GA Lbr Carroll/2nd Weight Sex Delivery Anes PTL Lv   3 Current            2 Term 03/17/15 40w1d  4.394 kg M Vag-Spont EPI N KENZIE   1 Term 13 38w0d  3.289 kg F Vag-Spont EPI N KENZIE          Ms. Breann Moore is seen with pregnancy at 39w5d for active labor. Prenatal course was normal.  the patients states that the baby moves as usual   Please see prenatal records for details. Past Medical History:   Diagnosis Date    Abnormal Papanicolaou smear of cervix 2015    colpo-+HPV per pt    Anemia 2017    Gestational diabetes     Migraine     w/ Aura      Past Surgical History:   Procedure Laterality Date    HX ORTHOPAEDIC      5 knee surg and ankle     HX REFRACTIVE SURGERY      PRK     HX TONSILLECTOMY       Social History     Occupational History    Not on file. Social History Main Topics    Smoking status: Never Smoker    Smokeless tobacco: Never Used    Alcohol use No    Drug use: No    Sexual activity: Yes     Partners: Male     Birth control/ protection: None     Family History   Problem Relation Age of Onset    Hypertension Mother     No Known Problems Father     Diabetes Maternal Grandmother     Diabetes Paternal Grandmother        Allergies   Allergen Reactions    Pcn [Penicillins] Rash     Prior to Admission medications    Medication Sig Start Date End Date Taking? Authorizing Provider   FERROUS SULFATE (IRON PO) Take  by mouth. Yes Historical Provider   metFORMIN ER (GLUCOPHAGE XR) 500 mg tablet Take 1 Tab by mouth two (2) times a day.  17  Yes Mitchell Luis MD   magnesium oxide (MAG-OX) 400 mg tablet Take 1 Tab by mouth two (2) times a day. 17  Yes Kieran Garcia MD   PNV no.24-iron-folic acid-dha (PRENATAL DHA+COMPLETE PRENATAL) -568 mg-mcg-mg cmpk Take  by mouth. Yes Historical Provider        Review of Systems:  Constitutional:No headache, fever  Cardiac:   No chest pain      Resp: No cough or shortness of breath     GI:   No nausea/vomiting, diarrhea, abdominal pain    :   No dysuria  Neuro:     No vision changes, headache      Objective:     Vitals:  Vitals:    10/18/17 2156 10/18/17 2157 10/18/17 2202   BP:   106/74   Pulse:   99   Resp:   16   Temp:  98 °F (36.7 °C)    Weight: 85.7 kg (189 lb)          Physical Exam:  Patient without distress. Heart: Regular rate and rhythm  Lung: clear to auscultation throughout lung fields, no wheezes, no rales, no rhonchi and normal respiratory effort  Back: costovertebral angle tenderness absent  Abdomen: soft, nontender  Fundus: soft and non tender  Cervical Exam: 5cm/80%/vtx/-2  Lower Extremities: no evidence of DVT  Membranes:  Intact  Fetal Heart Rate: Baseline: 145 per minute  Variability: moderate  Accelerations: yes  Decelerations: none  Uterine contractions: regular, every 3-4 minutes    Prenatal Labs:   Lab Results   Component Value Date/Time    Rubella, External Immune 2017    GrBStrep, External negative  2017    HBsAg, External Negative 2017    HIV, External Negative 2017    RPR, External Negative 2017         Assessment/Plan:     Ms. Harish Miranda is a  seen with pregnancy at 39w5d for active labor. Lab Results   Component Value Date/Time    GrBStrep, External negative  2017       Plan:     Admit for labor management    Patient discussed with Dr. Cuca Ha.

## 2017-10-19 NOTE — H&P
History & Physical    Name: Jodie Fry MRN: [id number]  SSN: [id number]    YOB: 1980  Age: 31 y.o. Sex: female        Subjective:     Estimated Date of Delivery: 10/20/17  OB History    Para Term  AB Living   3 2 2   2   SAB TAB Ectopic Molar Multiple Live Births        2      # Outcome Date GA Lbr Carroll/2nd Weight Sex Delivery Anes PTL Lv   3 Current            2 Term 03/17/15 40w1d  9 lb 11 oz (4.394 kg) M Vag-Spont EPI N KENZIE   1 Term 13 38w0d  7 lb 4 oz (3.289 kg) F Vag-Spont EPI N KENZIE          Ms. Chela Christensen is admitted with pregnancy at 39w6d for active labor. Prenatal course was significant for GDM on diet and metformin. Last EFW ~ 2 weeks ago Jaya Stabs, is proven to [address] Sugars were high on arrival but responded to insulin drip which has now been stopped. Last 3-4 sugars all good. Please see prenatal records for details. Past Medical History:   Diagnosis Date    Abnormal Papanicolaou smear of cervix 2015    colpo-+HPV per pt    Anemia 2017    Gestational diabetes     Migraine     w/ Aura      Past Surgical History:   Procedure Laterality Date    HX ORTHOPAEDIC      5 knee surg and ankle     HX REFRACTIVE SURGERY      PRK     HX TONSILLECTOMY       Social History     Occupational History    Not on file. Social History Main Topics    Smoking status: Never Smoker    Smokeless tobacco: Never Used    Alcohol use No    Drug use: No    Sexual activity: Yes     Partners: Male     Birth control/ protection: None     Family History   Problem Relation Age of Onset    Hypertension Mother     No Known Problems Father     Diabetes Maternal Grandmother     Diabetes Paternal Grandmother        Allergies   Allergen Reactions    Pcn [Penicillins] Rash     Prior to Admission medications    Medication Sig Start Date End Date Taking? Authorizing Provider   FERROUS SULFATE (IRON PO) Take  by mouth.    Yes Historical Provider   metFORMIN ER (GLUCOPHAGE XR) 500 mg tablet Take 1 Tab by mouth two (2) times a day. 17  Yes Mitchell Luis MD   PNV no.79-dhvf-sjraj acid-dha (PRENATAL DHA+COMPLETE PRENATAL) -960 mg-mcg-mg cmpk Take  by mouth. Yes Historical Provider   magnesium oxide (MAG-OX) 400 mg tablet Take 1 Tab by mouth two (2) times a day. 17   Mitchell Luis MD        Review of Systems: Pertinent items are noted in HPI. Objective:     Vitals:  Vitals:    10/19/17 0425 10/19/17 0441 10/19/17 0455 10/19/17 0512   BP: 106/58 104/56 100/57 107/55   Pulse: (!) 104 92 89 93   Resp: 16 14 16 14   Temp: 98.1 °F (36.7 °C)      Weight:            Physical Exam:  Heart: Regular rate and rhythm  Lung: clear to auscultation throughout lung fields, no wheezes, no rales, no rhonchi and normal respiratory effort  Abdomen: soft, nontender  Cervical Exam: 8/100 %/-1/   Membranes:  Artificial Rupture of Membranes; Amniotic Fluid: large amount of clear fluid  Fetal Heart Rate: Reactive    Prenatal Labs:   Lab Results   Component Value Date/Time    ABO/Rh(D) A POSITIVE 10/18/2017 10:35 PM    Rubella, External Immune 2017    GrBStrep, External negative  2017    HBsAg, External Negative 2017    HIV, External Negative 2017    RPR, External Negative 2017    ABO,Rh A positive 2017         Assessment/Plan:     Active Problems:    White classification A2 gestational diabetes mellitus (2017)      Overview:  x2 hx GDM w/ G1; proven to 9#11; Quad neg       Previous deliveries at [facility]             Failed 3hrGTT; A1DM       New dx today of Polyhydramnios--MONSTER 27 (1 pocket 11cm)       GP 45%, AC 64%, bpp /, vertex            Rx Metformin given -->now treating as A2DM             (10/5/17)       GP 83% (8#1, 3648g), AC 97%, MONSTER 21 (all values <8)      Discussed at this point does not qualify for elective c/s for macrosomia       (ACOG recs offering c/s if >5,000g in pt w/OUT diabetes, and >4,500 in       pt's WITH diabetes. Proven 9#11      No hx shoulder dystocia        HGSIL Pap, +HR HPV (5/7/2017)      Overview: Refer to Hermelindo Locke given pregnant             S/p Aguilar Locke 5/12/17 at 16wks -->Colposcopy adequate, no evidence severe       dysplasia changes. Planned for repeat colpo w/ him at 32 weeks      Polyhydramnios (9/7/2017)      Overview: (9/7) New dx at 51k2o--JLS 27 (1 pocket 11cm)       GP 45%, AC 64%, bpp 8/8, vertex      Normal labor (10/19/2017)      40 weeks gestation of pregnancy (10/19/2017)         Plan: Admit for Reassuring fetal status, Labor  Progressing normally  Continue expectant management, Continue plan for vaginal delivery. Group B Strep was negative. Go back to q2h sugars.     Signed By:  Lucy Saldana MD     October 19, 2017

## 2017-10-20 PROCEDURE — 74011250637 HC RX REV CODE- 250/637: Performed by: OBSTETRICS & GYNECOLOGY

## 2017-10-20 PROCEDURE — 65270000029 HC RM PRIVATE

## 2017-10-20 RX ORDER — IBUPROFEN 800 MG/1
800 TABLET ORAL
Qty: 90 TAB | Refills: 1 | Status: SHIPPED | OUTPATIENT
Start: 2017-10-20

## 2017-10-20 RX ORDER — IBUPROFEN 800 MG/1
800 TABLET ORAL EVERY 8 HOURS
Status: DISCONTINUED | OUTPATIENT
Start: 2017-10-20 | End: 2017-10-21 | Stop reason: HOSPADM

## 2017-10-20 RX ORDER — HYDROMORPHONE HYDROCHLORIDE 2 MG/1
2 TABLET ORAL
Qty: 5 TAB | Refills: 0 | Status: SHIPPED | OUTPATIENT
Start: 2017-10-20

## 2017-10-20 RX ADMIN — DOCUSATE SODIUM 100 MG: 100 CAPSULE, LIQUID FILLED ORAL at 09:38

## 2017-10-20 RX ADMIN — IBUPROFEN 800 MG: 800 TABLET, FILM COATED ORAL at 14:21

## 2017-10-20 RX ADMIN — DOCUSATE SODIUM 100 MG: 100 CAPSULE, LIQUID FILLED ORAL at 18:08

## 2017-10-20 RX ADMIN — IBUPROFEN 800 MG: 800 TABLET, FILM COATED ORAL at 23:40

## 2017-10-20 RX ADMIN — IBUPROFEN 800 MG: 800 TABLET, FILM COATED ORAL at 05:36

## 2017-10-20 NOTE — LACTATION NOTE
This note was copied from a baby's chart. In to follow up with mom and infant. Infant's bilirubin level came back high risk and was recently started under phototherapy. Infant was also circumcised this am. Observed mom latch and feed baby on left breast in cradle position. Infant latched well immediately and observed vigorous, rhythmic sucking. Mom has no complaints of pain. Mom encouraged to reach for upper normal amount of feeds in one day 10-12, to help with jaundice level, monitor output. Discussed with mom if she ever feels like infant is more sluggish at breast and not feeding consistently well, she can always ask for access to hospital grade pump and pump behind any poor feeds to help protect her milk supply and to be able to give back expressed breast milk to infant. Mom verbalized understanding. Completed suction test on her personal pump at bedside per request- WNL. Will do bra fitting before discharge tomorrow. Infant continues to feed very well at breast. Mom has no further questions or needs at this time. Lactation to follow up in am for discharge.

## 2017-10-20 NOTE — DISCHARGE SUMMARY
Obstetrical Discharge Summary     Name: Juan Carlos Christianson MRN: [id number]  SSN: [id number]    YOB: 1980  Age: 31 y.o. Sex: female      Admit Date: 10/18/2017    Discharge Date: 10/20/2017     Admitting Physician: Russell Yun MD     Attending Physician:  Shreya Lancaster MD     * Admission Diagnoses: contractions; Normal labor;Normal labor;40 weeks gestation o*    * Discharge Diagnoses:   Information for the patient's :  Ross Claudio [[id number]]   Delivery of a 9 lb 7 oz (4.28 kg) male infant via Vaginal, Spontaneous Delivery on 10/19/2017 at 9:33 AM  by . Apgars were 8 and 9. Additional Diagnoses:   Hospital Problems as of 10/20/2017  Date Reviewed: 10/19/2017          Codes Class Noted - Resolved POA    * (Principal)Normal labor ICD-10-CM: O80, Z37.9  ICD-9-CM: 862  10/19/2017 - Present Unknown        40 weeks gestation of pregnancy ICD-10-CM: Z3A.40  ICD-9-CM: V22.2  10/19/2017 - Present Unknown        Polyhydramnios ICD-10-CM: O40. 9XX0  ICD-9-CM: 657.00  2017 - Present Yes    Overview Signed 2017 10:33 AM by Mitchell Luis MD     () New dx at 79q5p--BFA 27 (1 pocket 11cm)   GP 45%, AC 64%, bpp 8/8, vertex              HGSIL Pap, +HR HPV ICD-10-CM: R87.613  ICD-9-CM: 795.04  2017 - Present Yes    Overview Addendum 10/20/2017  8:04 AM by Mitchell Luis MD     Refer to Karen Mota given pregnant     S/p Arleth Mota 17 at 16wks -->Colposcopy adequate, no evidence severe dysplasia changes.  Planned for repeat colpo w/ him at 32 weeks  32wks:  Non-invasive dz    Needs colpo 6-8wks PP              White classification A2 gestational diabetes mellitus ICD-10-CM: O24.419  ICD-9-CM: 648.80, V58.67  2017 - Present Yes    Overview Addendum 10/17/2017 10:03 PM by Mitchell Luis MD      x2 hx GDM w/ G1; proven to 9#11; Quad neg   Previous deliveries at [facility]     Failed 3hrGTT; A1DM   New dx today of Polyhydramnios--MONSTER 27 (1 pocket 11cm)   GP 45%, AC 64%, bpp , vertex    Rx Metformin given -->now treating as A2DM     (10/5/17)   GP 83% (8#1, 3648g), AC 97%, MONSTER 21 (all values <8)  Discussed at this point does not qualify for elective c/s for macrosomia (ACOG recs offering c/s if >5,000g in pt w/OUT diabetes, and >4,500 in pt's WITH diabetes. Proven 9#11  No hx shoulder dystocia                   Lab Results   Component Value Date/Time    ABO/Rh(D) A POSITIVE 10/18/2017 10:35 PM    Rubella, External Immune 2017    GrBStrep, External negative  2017    ABO,Rh A positive 2017      Immunization History   Administered Date(s) Administered    Influenza Vaccine 2016    Tdap 03/10/2017       * Procedures:           * Discharge Condition: good    * Hospital Course: Normal hospital course following the delivery. * Disposition: Home    Discharge Medications:   Current Discharge Medication List      START taking these medications    Details   ibuprofen (MOTRIN) 800 mg tablet Take 1 Tab by mouth every eight (8) hours as needed for Pain. Qty: 90 Tab, Refills: 1      HYDROmorphone (DILAUDID) 2 mg tablet Take 1 Tab by mouth every four (4) hours as needed for Pain. Max Daily Amount: 12 mg.  Qty: 5 Tab, Refills: 0         CONTINUE these medications which have NOT CHANGED    Details   PNV no.24-iron-folic acid-dha (PRENATAL DHA+COMPLETE PRENATAL) -300 mg-mcg-mg cmpk Take  by mouth. STOP taking these medications       FERROUS SULFATE (IRON PO) Comments:   Reason for Stopping:         metFORMIN ER (GLUCOPHAGE XR) 500 mg tablet Comments:   Reason for Stopping:         magnesium oxide (MAG-OX) 400 mg tablet Comments:   Reason for Stopping:               * Follow-up Care/Patient Instructions:   Activity: No sex for 6 weeks, No driving while on analgesics and No heavy lifting for 6 weeks  Diet: Regular Diet  Wound Care: Keep wound clean and dry    Follow-up Information     Follow up With Details Comments Contact Info    Phys Other, MD   Patient can only remember the practice name and not the physician             Signed By:  Chuck Sullivan MD     October 20, 2017

## 2017-10-20 NOTE — ANESTHESIA POSTPROCEDURE EVALUATION
Post-Anesthesia Evaluation and Assessment    Patient: Steffi Mandel MRN: [id number]  SSN: [id number]    YOB: 1980  Age: 31 y.o. Sex: female       Cardiovascular Function/Vital Signs  Visit Vitals    /56 (BP 1 Location: Left arm, BP Patient Position: At rest)    Pulse (!) 108    Temp 36.7 °C (98.1 °F)    Resp 18    Wt 85.7 kg (189 lb)    Breastfeeding Unknown       Patient is status post epidural anesthesia for * No procedures listed *. Nausea/Vomiting: None    Postoperative hydration reviewed and adequate. Pain:  Pain Scale 1: Numeric (0 - 10) (10/19/17 1350)  Pain Intensity 1: 0 (10/19/17 1350)   Managed    Neurological Status:   Neuro (WDL): Within Defined Limits (10/19/17 1055)  Neuro  LLE Motor Response: Floppy (10/19/17 0942)  RLE Motor Response: Floppy (10/19/17 0942)   At baseline    Mental Status and Level of Consciousness: Arousable    Pulmonary Status:   O2 Device: Room air (10/19/17 1350)   Adequate oxygenation and airway patent    Complications related to anesthesia: None    Post-anesthesia assessment completed.  No concerns    Signed By: Aliyah Rojas MD     October 19, 2017

## 2017-10-20 NOTE — PROGRESS NOTES
Bedside Report of care using Wow received from [facility] AT [facility]. Pt stable.

## 2017-10-20 NOTE — PROGRESS NOTES
Progress Note                               Patient: Jodie Fry MRN: [id number]  SSN: [id number]    YOB: 1980  Age: 31 y.o. Sex: female      Postpartum Day Number 1    Subjective:     Patient doing well postpartum without significant complaints. Voiding without difficulty. Patient reports normal lochia. .  Pain well controlled, voiding on her own without difficulty. Breast feeding. Denies HA, SOB/CP, RUQ pain, Vision changes. Objective:     Patient Vitals for the past 12 hrs:   Temp Pulse Resp BP   10/20/17 0725 97.9 °F (36.6 °C) 83 16 109/62       Temp (24hrs), Av.4 °F (36.9 °C), Min:97.9 °F (36.6 °C), Max:98.8 °F (37.1 °C)      Physical Exam:    Constitutional: She appears well-developed and well-nourished. No distress. HENT:    Head: Normocephalic and atraumatic. Cardiovascular: RRR  Pulmonary/Chest: CTAB  Abd:  S/NTTP/ND, BS normoactive, fundus firm at umbilicus  Ext:  No c/c/e      Lab/Data Review:  CBC:    Recent Labs      10/18/17   2235   WBC  11.7*   HGB  13.0   HCT  38.4   PLT  245     GBS:   Lab Results   Component Value Date/Time    GrBStrep, External negative  2017     Blood Type:   Lab Results   Component Value Date/Time    ABO/Rh(D) A POSITIVE 10/18/2017 10:35 PM    ABO,Rh A positive 2017        Assessment and Plan:     31 y.o.  ppd# 1 s/p :    1) PP:  Meeting all pp goals, continue routine care. Considering early DC home today. 2) Breast feeding, Rh pos, Rub imm   3) A2DM/Poly:  FSBG <120; will DC checks now.   Needs 2hr GTT 6-8wks pp  4) HGSIL, +HR HPV:  Needs Colpo 6-8wks pp           Signed By: Martin Campos MD     2017

## 2017-10-20 NOTE — PROGRESS NOTES
Shift assessment complete. Pt to shower without difficulty. Denies needs at this time. Questions encouraged and answered. Pt instructed to call for needs or concerns. Verbalized understanding.

## 2017-10-20 NOTE — PROGRESS NOTES
Report received from Forrest Brooks RN. Patient care assumed-bedside reporting completed. Pt breast feeding infant at this time. Denies any needs.

## 2017-10-20 NOTE — PROGRESS NOTES
Motrin 800 mg PO given to pt per request.  Educated pt to call out if pain medication does not help.

## 2017-10-21 VITALS
RESPIRATION RATE: 18 BRPM | DIASTOLIC BLOOD PRESSURE: 77 MMHG | TEMPERATURE: 98.1 F | OXYGEN SATURATION: 97 % | HEART RATE: 88 BPM | WEIGHT: 189 LBS | SYSTOLIC BLOOD PRESSURE: 115 MMHG

## 2017-10-21 LAB
HBV SURFACE AG SERPL QL IA: NEGATIVE
HCV AB S/CO SERPL IA: <0.1 S/CO RATIO (ref 0–0.9)
HCV AB SERPL QL IA: NORMAL
HIV1 P24 AG SERPL QL IA: NONREACTIVE
HIV1+2 AB SERPL QL IA: NONREACTIVE

## 2017-10-21 PROCEDURE — 74011250637 HC RX REV CODE- 250/637: Performed by: OBSTETRICS & GYNECOLOGY

## 2017-10-21 RX ADMIN — IBUPROFEN 800 MG: 800 TABLET, FILM COATED ORAL at 10:14

## 2017-10-21 RX ADMIN — DOCUSATE SODIUM 100 MG: 100 CAPSULE, LIQUID FILLED ORAL at 10:14

## 2017-10-21 NOTE — PROGRESS NOTES
Patient discharged to home after ID bands verified and 's code alert removed. Discharge teaching complete, patient verbalizes understanding; questions encouraged. Prescriptions given. Instructed patient to call out when infant is in car seat and they are ready to leave unit, patient verbalized understanding.

## 2017-10-21 NOTE — LACTATION NOTE
In to follow up with mom and infant prior to discharge to home. Mom stated that infant is latching and nursing well and has no concerns at this time. Mom did want a bra fitting which I did and she purchased a breast feeding bra. Encouraged mom to follow up with our outpatient lactation consultant as needed.

## 2017-10-21 NOTE — PROGRESS NOTES
Report of care received from, [facility] RN. Bedside report given, pt asleep at present time.

## 2017-10-21 NOTE — DISCHARGE INSTRUCTIONS
Discharge instruction to follow: Activity: Pelvis rest for 6 weeks     No heavy lifting over 15 lbs for 2 weeks     No push/pull motion such as sweeping or vacuuming for 2 weeks     No tub baths for 6 weeks    If using sitz bath continue until comfortable stopping. If using gala-bottle continue to use until comfortable stopping. Change sanitary pad after each urination or bowel movement. Call MD for the following:      Fever over 101 F; pain not relieved by medication; foul smelling vaginal discharge or an increase in vaginal bleeding. Take medication as prescribed. Follow up with MD as order. After Your Delivery (the Postpartum Period): Care Instructions  Your Care Instructions  Congratulations on the birth of your baby. Like pregnancy, the  period can be a time of excitement, ted, and exhaustion. You may look at your wondrous little baby and feel happy. You may also be overwhelmed by your new sleep hours and new responsibilities. At first, babies often sleep during the days and are awake at night. They do not have a pattern or routine. They may make sudden gasps, jerk themselves awake, or look like they have crossed eyes. These are all normal, and they may even make you smile. In these first weeks after delivery, try to take good care of yourself. It may take 4 to 6 weeks to feel like yourself again, and possibly longer if you had a  birth. You will likely feel very tired for several weeks. Your days will be full of ups and downs, but lots of ted as well. Follow-up care is a key part of your treatment and safety. Be sure to make and go to all appointments, and call your doctor if you are having problems. It's also a good idea to know your test results and keep a list of the medicines you take. How can you care for yourself at home? Take care of your body after delivery  · Use pads instead of tampons for the bloody flow that may last as long as 2 weeks.   · Ease cramps with ibuprofen (Advil, Motrin). · Ease soreness of hemorrhoids and the area between your vagina and rectum with ice compresses or witch hazel pads. · Ease constipation by drinking lots of fluid and eating high-fiber foods. Ask your doctor about over-the-counter stool softeners. · Cleanse yourself with a gentle squeeze of warm water from a bottle instead of wiping with toilet paper. · Take a sitz bath in warm water several times a day. · Wear a good nursing bra. Ease sore and swollen breasts with warm, wet washcloths. · If you are not breastfeeding, use ice rather than heat for breast soreness. · Your period may not start for several months if you are breastfeeding. You may bleed more, and longer at first, than you did before you got pregnant. · Wait until you are healed (about 4 to 6 weeks) before you have sexual intercourse. Your doctor will tell you when it is okay to have sex. · Try not to travel with your baby for 5 or 6 weeks. If you take a long car trip, make frequent stops to walk around and stretch. Avoid exhaustion  · Rest every day. Try to nap when your baby naps. · Ask another adult to be with you for a few days after delivery. · Plan for  if you have other children. · Stay flexible so you can eat at odd hours and sleep when you need to. Both you and your baby are making new schedules. · Plan small trips to get out of the house. Change can make you feel less tired. · Ask for help with housework, cooking, and shopping. Remind yourself that your job is to care for your baby. Know about help for postpartum depression  · \"Baby blues\" are common for the first 1 to 2 weeks after birth. You may cry or feel sad or irritable for no reason. · Rest whenever you can. Being tired makes it harder to handle your emotions. · Go for walks with your baby. · Talk to your partner, friends, and family about your feelings.   · If your symptoms last for more than a few weeks, or if you feel very depressed, ask your doctor for help. · Postpartum depression can be treated. Support groups and counseling can help. Sometimes medicine can also help. Stay healthy  · Eat healthy foods so you have more energy, make good breast milk, and lose extra baby pounds. · If you breastfeed, avoid alcohol and drugs. Stay smoke-free. If you quit during pregnancy, congratulations. · Start daily exercise after 4 to 6 weeks, but rest when you feel tired. · Learn exercises to tone your belly. Do Kegel exercises to regain strength in your pelvic muscles. You can do these exercises while you stand or sit. ¨ Squeeze the same muscles you would use to stop your urine. Your belly and thighs should not move. ¨ Hold the squeeze for 3 seconds, and then relax for 3 seconds. ¨ Start with 3 seconds. Then add 1 second each week until you are able to squeeze for 10 seconds. ¨ Repeat the exercise 10 to 15 times for each session. Do three or more sessions each day. · Find a class for new mothers and new babies that has an exercise time. · If you had a  birth, give yourself a bit more time before you exercise, and be careful. When should you call for help? Call 911 anytime you think you may need emergency care. For example, call if:  · You passed out (lost consciousness). Call your doctor now or seek immediate medical care if:  · You have severe vaginal bleeding. This means you are passing blood clots and soaking through a pad each hour for 2 or more hours. · You are dizzy or lightheaded, or you feel like you may faint. · You have a fever. · You have new belly pain, or your pain gets worse. Watch closely for changes in your health, and be sure to contact your doctor if:  · Your vaginal bleeding seems to be getting heavier. · You have new or worse vaginal discharge. · You feel sad, anxious, or hopeless for more than a few days. · You do not get better as expected. Where can you learn more?   Go to http://marcella-joey.info/. Enter A461 in the search box to learn more about \"After Your Delivery (the Postpartum Period): Care Instructions. \"  Current as of: March 16, 2017  Content Version: 11.3  © 9683-0032 [employer]. Care instructions adapted under license by [employer] (which disclaims liability or warranty for this information). If you have questions about a medical condition or this instruction, always ask your healthcare professional. [address] any warranty or liability for your use of this information. Vaginal Childbirth: Care Instructions  Your Care Instructions  Your body will slowly heal in the next few weeks. It is easy to get too tired and overwhelmed during the first weeks after your baby is born. Changes in your hormones can shift your mood without warning. You may find it hard to meet the extra demands on your energy and time. Take it easy on yourself. Follow-up care is a key part of your treatment and safety. Be sure to make and go to all appointments, and call your doctor if you are having problems. It's also a good idea to know your test results and keep a list of the medicines you take. How can you care for yourself at home? · Vaginal bleeding and cramps  ¨ After delivery, you will have a bloody discharge from the vagina. This will turn pink within a week and then white or yellow after about 10 days. It may last for 2 to 4 weeks or longer, until the uterus has healed. Use pads instead of tampons until you stop bleeding. ¨ Do not worry if you pass some blood clots, as long as they are smaller than a golf ball. If you have a tear or stitches in your vaginal area, change the pad at least every 4 hours to prevent soreness and infection. ¨ You may have cramps for the first few days after childbirth. These are normal and occur as the uterus shrinks to normal size.  Take an over-the-counter pain medicine, such as acetaminophen (Tylenol), ibuprofen (Advil, Motrin), or naproxen (Aleve), for cramps. Read and follow all instructions on the label. Do not take aspirin, because it can cause more bleeding. ¨ Do not take two or more pain medicines at the same time unless the doctor told you to. Many pain medicines have acetaminophen, which is Tylenol. Too much acetaminophen (Tylenol) can be harmful. · Stitches  ¨ If you have stitches, they will dissolve on their own and do not need to be removed. Follow your doctor's instructions for cleaning the stitched area. ¨ Put ice or a cold pack on your painful area for 10 to 20 minutes at a time, several times a day, for the first few days. Put a thin cloth between the ice and your skin. ¨ Sit in a few inches of warm water (sitz bath) 3 times a day and after bowel movements. The warm water helps with pain and itching. If you do not have a tub, a warm shower might help. · Breast fullness  ¨ Your breasts may overfill (engorge) in the first few days after delivery. To help milk flow and to relieve pain, warm your breasts in the shower or by using warm, moist towels before nursing. ¨ If you are not nursing, do not put warmth on your breasts or touch your breasts. Wear a tight bra or sports bra and use ice until the fullness goes away. This usually takes 2 to 3 days. ¨ Put ice or a cold pack on your breast after nursing to reduce swelling and pain. Put a thin cloth between the ice and your skin. · Activity  ¨ Eat a balanced diet. Do not try to lose weight by cutting calories. Keep taking your prenatal vitamins, or take a multivitamin. ¨ Get as much rest as you can. Try to take naps when your baby sleeps during the day. ¨ Get some exercise every day. But do not do any heavy exercise until your doctor says it is okay. ¨ Wait until you are healed (about 4 to 6 weeks) before you have sexual intercourse. Your doctor will tell you when it is okay to have sex.   ¨ Talk to your doctor about birth control. You can get pregnant even before your period returns. Also, you can get pregnant while you are breastfeeding. · Mental health  ¨ It is normal to have some sadness, anxiety, sleeplessness, and mood swings after you go home. If you feel upset or hopeless for more than a few days or are having trouble doing the things you need to do, talk to your doctor. · Constipation and hemorrhoids  ¨ Drink plenty of fluids, enough so that your urine is light yellow or clear like water. If you have kidney, heart, or liver disease and have to limit fluids, talk with your doctor before you increase the amount of fluids you drink. ¨ Eat plenty of fiber each day. Have a bran muffin or bran cereal for breakfast, and try eating a piece of fruit for a mid-afternoon snack. ¨ For painful, itchy hemorrhoids, put ice or a cold pack on the area several times a day for 10 minutes at a time. Follow this by putting a warm compress on the area for another 10 to 20 minutes or by sitting in a shallow, warm bath. When should you call for help? Call 911 anytime you think you may need emergency care. For example, call if:  · You are thinking of hurting yourself, your baby, or anyone else. · You have sudden, severe pain in your belly. · You passed out (lost consciousness). Call your doctor now or seek immediate medical care if:  · You have severe vaginal bleeding. · You are soaking through a pad each hour for 2 or more hours. · Your vaginal bleeding seems to be getting heavier or is still bright red 4 days after delivery. · You are dizzy or lightheaded, or you feel like you may faint. · You are vomiting or cannot keep fluids down. · You have a fever. · You have new or more belly pain. · You pass tissue (not just blood). · Your vaginal discharge smells bad. · Your belly feels tender or full and hard. · Your breasts are continuously painful or red. · You feel sad, anxious, or hopeless for more than a few days.   Watch closely for changes in your health, and be sure to contact your doctor if you have any problems. Where can you learn more? Go to http://marcella-joey.info/. Enter T394 in the search box to learn more about \"Vaginal Childbirth: Care Instructions. \"  Current as of: March 16, 2017  Content Version: 11.3  © 3645-6423 [employer]. Care instructions adapted under license by [employer] (which disclaims liability or warranty for this information). If you have questions about a medical condition or this instruction, always ask your healthcare professional. [address] any warranty or liability for your use of this information.

## 2017-10-21 NOTE — LACTATION NOTE
Mom and baby are going home today. Continue to offer the breast without restriction. Mom's milk should be fully in over the next few days. Reviewed engorgement precautions. Hand Expression has been demoed and written hand-out reviewed. As milk comes in baby will be more alert at the breast and swallows will be more obvious. Breasts may feel softer once baby has finished nursing. Baby should be back to birth weight by 3weeks of age. And then gain on average 1 oz per day for the next 2-3 months. Reviewed babies should be exclusively breastfeeding for the first 6 months and that breastfeeding should continue after introduction of appropriate complimentary foods after 6 months. Initial output should be at least 1 wet and 1 bowel movement for each day old baby is. By day 5-7 once milk is fully in baby will consistently have 6 or more soaking wet diapers and about 4 bowel movement. Some babies have a bowel movement with every feeding and some have 1-3 large bowel movements each day. Inadequate output may indicate inadequate feedings and should be reported to your Pediatrician. Bowel habits may change as baby gets older. Encouraged follow-up at Pediatrician in 1-2 days, no later than 1 week of age. Call [facility] for any questions as needed or to set up an OP visit. OP phone calls are returned within 24 hours. Breastfeeding Support Group is offered here monthly. Community Breastfeeding Resource List given.

## 2017-10-21 NOTE — PROGRESS NOTES
Pt was d/c'd yday.  Stayed b/c of baby's bili  No c/o's  AF/VSS  Left rx nursing bra and pump per pt request  D/c summary already done

## 2017-12-01 PROBLEM — R73.03 PREDIABETES: Status: ACTIVE | Noted: 2017-12-01

## 2018-09-05 NOTE — PROGRESS NOTES
"Marisol Eldridge is a 22 y.o. female.   Chief Complaint   Patient presents with   • Nasal Congestion     pt atates that this has been going on since yesterday morning.    • Sore Throat     scratchy throat   • Fever   • Earache      History of Present Illness As Above.  Patient thinks she has been running a fever.  Has mild headache.  Has nasal congestion.  Possible left ear pain, has scratchy  throat, shortness of air, cough, chest pain, abdominal pain, nausea vomiting diarrhea, dysuria, blood in stool or urine.  Mood is good.  Eating and drinking as usual.  Is in sorority rush.   Taking Zyrtec since yesterday without much relief.  LMP 2.5 weeks ago.      The following portions of the patient's history were reviewed and updated as appropriate: allergies, current medications, past family history, past medical history, past social history, past surgical history and problem list.    Current Outpatient Prescriptions:   •  ferrous sulfate 325 (65 FE) MG tablet, Take 325 mg by mouth Daily With Breakfast., Disp: , Rfl:   •  TRI-PREVIFEM 0.18/0.215/0.25 MG-35 MCG per tablet, , Disp: , Rfl:   •  fluticasone (FLONASE) 50 MCG/ACT nasal spray, 2 sprays into the nostril(s) as directed by provider Daily., Disp: 1 bottle, Rfl: 1    Review of Systems Consitutional, HEENT, Respiratory, CV, GI, , Skin, Musculoskeletal, Neuro-mental, Endocrinological, Hematological were reviewed.  Positives were discussed in the HPI, otherwise ROS was negative   /78   Pulse 82   Temp 98.7 °F (37.1 °C)   Ht 167.6 cm (66\")   Wt 72.6 kg (160 lb)   SpO2 99%   BMI 25.82 kg/m²     Objective   No Known Allergies    Physical Exam   Constitutional: She is oriented to person, place, and time. She appears well-developed and well-nourished. No distress.   HENT:   Head: Normocephalic.   Right Ear: External ear normal.   Left Ear: External ear normal.   Mouth/Throat: Oropharynx is clear and moist.   Nontender over sinuses   Eyes: Right " Patient assessment completed as noted. eye exhibits no discharge. Left eye exhibits no discharge. No scleral icterus.   Neck: Neck supple. No thyromegaly present.   Cardiovascular: Normal rate, regular rhythm, normal heart sounds and intact distal pulses.  Exam reveals no gallop and no friction rub.    No murmur heard.  Pulmonary/Chest: Effort normal and breath sounds normal. No respiratory distress.   Abdominal: Soft. Bowel sounds are normal. There is no tenderness.   Musculoskeletal:   CARRANZA well    Lymphadenopathy:     She has no cervical adenopathy.   Neurological: She is alert and oriented to person, place, and time.   Skin: Skin is warm and dry.   Is pink, no rash    Psychiatric: She has a normal mood and affect. Her behavior is normal. Judgment and thought content normal.   Nursing note and vitals reviewed.      Procedures    Assessment/Plan   Vani was seen today for nasal congestion, sore throat, fever and earache.    Diagnoses and all orders for this visit:    Sore throat  -     POCT rapid strep A  -     fluticasone (FLONASE) 50 MCG/ACT nasal spray; 2 sprays into the nostril(s) as directed by provider Daily.    Body aches  -     POC Influenza A / B          Patient Instructions   Strep and flu swabs were negative.  Continue Zyrtec.  Sitting and prescription for Flonase.Drink plenty of fluids.  Tylenol for pain.  Robitussin DM for cough unless otherwise directed.  Call on Monday if not steadily improving.  Sore Throat  When you have a sore throat, your throat may:  · Hurt.  · Burn.  · Feel irritated.  · Feel scratchy.    Many things can cause a sore throat, including:  · An infection.  · Allergies.  · Dryness in the air.  · Smoke or pollution.  · Gastroesophageal reflux disease (GERD).  · A tumor.    A sore throat can be the first sign of another sickness. It can happen with other problems, like coughing or a fever. Most sore throats go away without treatment.  Follow these instructions at home:  · Take over-the-counter medicines only as told by  your doctor.  · Drink enough fluids to keep your pee (urine) clear or pale yellow.  · Rest when you feel you need to.  · To help with pain, try:  ? Sipping warm liquids, such as broth, herbal tea, or warm water.  ? Eating or drinking cold or frozen liquids, such as frozen ice pops.  ? Gargling with a salt-water mixture 3-4 times a day or as needed. To make a salt-water mixture, add ½-1 tsp of salt in 1 cup of warm water. Mix it until you cannot see the salt anymore.  ? Sucking on hard candy or throat lozenges.  ? Putting a cool-mist humidifier in your bedroom at night.  ? Sitting in the bathroom with the door closed for 5-10 minutes while you run hot water in the shower.  · Do not use any tobacco products, such as cigarettes, chewing tobacco, and e-cigarettes. If you need help quitting, ask your doctor.  Contact a doctor if:  · You have a fever for more than 2-3 days.  · You keep having symptoms for more than 2-3 days.  · Your throat does not get better in 7 days.  · You have a fever and your symptoms suddenly get worse.  Get help right away if:  · You have trouble breathing.  · You cannot swallow fluids, soft foods, or your saliva.  · You have swelling in your throat or neck that gets worse.  · You keep feeling like you are going to throw up (vomit).  · You keep throwing up.  This information is not intended to replace advice given to you by your health care provider. Make sure you discuss any questions you have with your health care provider.  Document Released: 09/26/2009 Document Revised: 08/13/2017 Document Reviewed: 10/07/2016  [employer] Interactive Patient Education © 2018 [employer]          Nicky Huynh APRN